# Patient Record
(demographics unavailable — no encounter records)

---

## 2024-12-05 NOTE — REVIEW OF SYSTEMS
[Dyspnea on Exertion] : dyspnea on exertion [Back Pain] : back pain [Negative] : Heme/Lymph [Shortness Of Breath] : no shortness of breath [Wheezing] : no wheezing [Cough] : no cough [Joint Pain] : no joint pain [Joint Stiffness] : no joint stiffness [Muscle Pain] : no muscle pain [Muscle Weakness] : no muscle weakness [Joint Swelling] : no joint swelling

## 2024-12-05 NOTE — HEALTH RISK ASSESSMENT
[No] : In the past 12 months have you used drugs other than those required for medical reasons? No [0] : 2) Feeling down, depressed, or hopeless: Not at all (0) [PHQ-2 Negative - No further assessment needed] : PHQ-2 Negative - No further assessment needed [Never] : Never [Audit-CScore] : 0 [[id number]] : 0

## 2024-12-05 NOTE — HEALTH RISK ASSESSMENT
[No] : In the past 12 months have you used drugs other than those required for medical reasons? No [0] : 2) Feeling down, depressed, or hopeless: Not at all (0) [PHQ-2 Negative - No further assessment needed] : PHQ-2 Negative - No further assessment needed [Never] : Never [Audit-CScore] : 0 [[id number]] : 0

## 2024-12-05 NOTE — PHYSICAL EXAM
[No Acute Distress] : no acute distress [Well Nourished] : well nourished [Well Developed] : well developed [Well-Appearing] : well-appearing [Normal Voice/Communication] : normal voice/communication [Normal Sclera/Conjunctiva] : normal sclera/conjunctiva [PERRL] : pupils equal round and reactive to light [EOMI] : extraocular movements intact [Normal Outer Ear/Nose] : the outer ears and nose were normal in appearance [Normal Oropharynx] : the oropharynx was normal [No JVD] : no jugular venous distention [No Lymphadenopathy] : no lymphadenopathy [Supple] : supple [Thyroid Normal, No Nodules] : the thyroid was normal and there were no nodules present [No Respiratory Distress] : no respiratory distress  [No Accessory Muscle Use] : no accessory muscle use [Clear to Auscultation] : lungs were clear to auscultation bilaterally [Normal Rate] : normal rate  [Regular Rhythm] : with a regular rhythm [Normal S1, S2] : normal S1 and S2 [No Murmur] : no murmur heard [No Carotid Bruits] : no carotid bruits [No Abdominal Bruit] : a ~M bruit was not heard ~T in the abdomen [Pedal Pulses Present] : the pedal pulses are present [No Edema] : there was no peripheral edema [No Palpable Aorta] : no palpable aorta [Soft] : abdomen soft [Non Tender] : non-tender [Non-distended] : non-distended [No Masses] : no abdominal mass palpated [No HSM] : no HSM [Normal Bowel Sounds] : normal bowel sounds [Normal Posterior Cervical Nodes] : no posterior cervical lymphadenopathy [Normal Anterior Cervical Nodes] : no anterior cervical lymphadenopathy [No CVA Tenderness] : no CVA  tenderness [No Spinal Tenderness] : no spinal tenderness [No Joint Swelling] : no joint swelling [Grossly Normal Strength/Tone] : grossly normal strength/tone [Coordination Grossly Intact] : coordination grossly intact [No Focal Deficits] : no focal deficits [Normal Gait] : normal gait [Deep Tendon Reflexes (DTR)] : deep tendon reflexes were 2+ and symmetric [Speech Grossly Normal] : speech grossly normal [Memory Grossly Normal] : memory grossly normal [Normal Affect] : the affect was normal [Alert and Oriented x3] : oriented to person, place, and time [Normal Mood] : the mood was normal [Normal Insight/Judgement] : insight and judgment were intact [de-identified] : good cap refill

## 2024-12-05 NOTE — PHYSICAL EXAM
[No Acute Distress] : no acute distress [Well Nourished] : well nourished [Well Developed] : well developed [Well-Appearing] : well-appearing [Normal Voice/Communication] : normal voice/communication [Normal Sclera/Conjunctiva] : normal sclera/conjunctiva [PERRL] : pupils equal round and reactive to light [EOMI] : extraocular movements intact [Normal Outer Ear/Nose] : the outer ears and nose were normal in appearance [Normal Oropharynx] : the oropharynx was normal [No JVD] : no jugular venous distention [No Lymphadenopathy] : no lymphadenopathy [Supple] : supple [Thyroid Normal, No Nodules] : the thyroid was normal and there were no nodules present [No Respiratory Distress] : no respiratory distress  [No Accessory Muscle Use] : no accessory muscle use [Clear to Auscultation] : lungs were clear to auscultation bilaterally [Normal Rate] : normal rate  [Regular Rhythm] : with a regular rhythm [Normal S1, S2] : normal S1 and S2 [No Murmur] : no murmur heard [No Carotid Bruits] : no carotid bruits [No Abdominal Bruit] : a ~M bruit was not heard ~T in the abdomen [Pedal Pulses Present] : the pedal pulses are present [No Edema] : there was no peripheral edema [No Palpable Aorta] : no palpable aorta [Soft] : abdomen soft [Non Tender] : non-tender [Non-distended] : non-distended [No Masses] : no abdominal mass palpated [No HSM] : no HSM [Normal Bowel Sounds] : normal bowel sounds [Normal Posterior Cervical Nodes] : no posterior cervical lymphadenopathy [Normal Anterior Cervical Nodes] : no anterior cervical lymphadenopathy [No CVA Tenderness] : no CVA  tenderness [No Spinal Tenderness] : no spinal tenderness [No Joint Swelling] : no joint swelling [Grossly Normal Strength/Tone] : grossly normal strength/tone [Coordination Grossly Intact] : coordination grossly intact [No Focal Deficits] : no focal deficits [Normal Gait] : normal gait [Deep Tendon Reflexes (DTR)] : deep tendon reflexes were 2+ and symmetric [Speech Grossly Normal] : speech grossly normal [Memory Grossly Normal] : memory grossly normal [Normal Affect] : the affect was normal [Alert and Oriented x3] : oriented to person, place, and time [Normal Mood] : the mood was normal [Normal Insight/Judgement] : insight and judgment were intact [de-identified] : good cap refill

## 2024-12-05 NOTE — HISTORY OF PRESENT ILLNESS
[FreeTextEntry1] : CAD, HLD, HTN, vit D [de-identified] : Pt has followed up several times with ortho since last visit.  He has seen spine.  Notes reviewed.    70 y/o male hx CAD s/p PCI, HTN, HLD, low vit D here for f/u admitted on 3/18/22 for elective LHC. s/p stent to lcx and om - with post-procedure vfib arrest and AMS. s/p admission 1/23 with unstable angina/MI s/p PCA @[facility]/[place] follows with Dr Oneill.  s/p recent f/u.  was given nitropatch Has followed up with Dr Lang for back pain .  s/p PT.  ? sciatic pain has been better after PT.  no leg sx.  Has followed up since l.ast visit.   He has seen ortho for shoulder pain and for, elbow pain.   Has been better.  REports that he had injection.   Hx boudreaux with walking for a while - currently denies.  no orthopnea.  No chest pains or palpitations.  no dizziness.   Was given nitropatch by cardiology as noted.  Reprots that he has continued with f/u.   Has been taking meds. d/w pt.  Has been walking for exercise. Reports that he is able to go up stairs and hills w/o any issues with the breathing or any chest pains. no edema. No dizziness.  No bleeding. bruising, clotting issues.  With hx GERD  -had for a few days.  now better.    s/p stents 3/22 and 1/23 as noted. Otherwise has been as been feeling well.  colon - '19.   gets flu vaccine

## 2024-12-05 NOTE — HISTORY OF PRESENT ILLNESS
[FreeTextEntry1] : CAD, HLD, HTN, vit D [de-identified] : Pt has followed up several times with ortho since last visit.  He has seen spine.  Notes reviewed.    70 y/o male hx CAD s/p PCI, HTN, HLD, low vit D here for f/u admitted on 3/18/22 for elective LHC. s/p stent to lcx and om - with post-procedure vfib arrest and AMS. s/p admission 1/23 with unstable angina/MI s/p PCA @[facility]/[place] follows with Dr Oneill.  s/p recent f/u.  was given nitropatch Has followed up with Dr Lang for back pain .  s/p PT.  ? sciatic pain has been better after PT.  no leg sx.  Has followed up since l.ast visit.   He has seen ortho for shoulder pain and for, elbow pain.   Has been better.  REports that he had injection.   Hx boudreaux with walking for a while - currently denies.  no orthopnea.  No chest pains or palpitations.  no dizziness.   Was given nitropatch by cardiology as noted.  Reprots that he has continued with f/u.   Has been taking meds. d/w pt.  Has been walking for exercise. Reports that he is able to go up stairs and hills w/o any issues with the breathing or any chest pains. no edema. No dizziness.  No bleeding. bruising, clotting issues.  With hx GERD  -had for a few days.  now better.    s/p stents 3/22 and 1/23 as noted. Otherwise has been as been feeling well.  colon - '19.   gets flu vaccine